# Patient Record
Sex: FEMALE | Race: WHITE | NOT HISPANIC OR LATINO | Employment: FULL TIME | URBAN - NONMETROPOLITAN AREA
[De-identification: names, ages, dates, MRNs, and addresses within clinical notes are randomized per-mention and may not be internally consistent; named-entity substitution may affect disease eponyms.]

---

## 2017-08-24 ENCOUNTER — TRANSCRIBE ORDERS (OUTPATIENT)
Dept: ADMINISTRATIVE | Facility: HOSPITAL | Age: 32
End: 2017-08-24

## 2017-08-24 ENCOUNTER — LAB (OUTPATIENT)
Dept: LAB | Facility: HOSPITAL | Age: 32
End: 2017-08-24
Attending: OBSTETRICS & GYNECOLOGY

## 2017-08-24 DIAGNOSIS — N93.9 EPISODE OF HEAVY VAGINAL BLEEDING: ICD-10-CM

## 2017-08-24 DIAGNOSIS — N93.9 EPISODE OF HEAVY VAGINAL BLEEDING: Primary | ICD-10-CM

## 2017-08-24 LAB
BASOPHILS # BLD AUTO: 0.05 10*3/MM3 (ref 0–0.2)
BASOPHILS NFR BLD AUTO: 0.8 % (ref 0–2)
DEPRECATED RDW RBC AUTO: 42.1 FL (ref 40–54)
EOSINOPHIL # BLD AUTO: 0.18 10*3/MM3 (ref 0–0.7)
EOSINOPHIL NFR BLD AUTO: 3 % (ref 0–4)
ERYTHROCYTE [DISTWIDTH] IN BLOOD BY AUTOMATED COUNT: 12.4 % (ref 12–15)
HCT VFR BLD AUTO: 37.2 % (ref 37–47)
HGB BLD-MCNC: 12.4 G/DL (ref 12–16)
LYMPHOCYTES # BLD AUTO: 1.81 10*3/MM3 (ref 0.72–4.86)
LYMPHOCYTES NFR BLD AUTO: 30.3 % (ref 15–45)
MCH RBC QN AUTO: 31.3 PG (ref 28–32)
MCHC RBC AUTO-ENTMCNC: 33.3 G/DL (ref 33–36)
MCV RBC AUTO: 93.9 FL (ref 82–98)
MONOCYTES # BLD AUTO: 0.57 10*3/MM3 (ref 0.19–1.3)
MONOCYTES NFR BLD AUTO: 9.5 % (ref 4–12)
NEUTROPHILS # BLD AUTO: 3.37 10*3/MM3 (ref 1.87–8.4)
NEUTROPHILS NFR BLD AUTO: 56.4 % (ref 39–78)
PLATELET # BLD AUTO: 186 10*3/MM3 (ref 130–400)
PMV BLD AUTO: 10.7 FL (ref 6–12)
RBC # BLD AUTO: 3.96 10*6/MM3 (ref 4.2–5.4)
T3FREE SERPL-MCNC: 4.15 PG/ML (ref 2.77–5.27)
T4 FREE SERPL-MCNC: 1.04 NG/DL (ref 0.78–2.19)
TSH SERPL DL<=0.05 MIU/L-ACNC: 2.04 MIU/ML (ref 0.47–4.68)
WBC NRBC COR # BLD: 5.98 10*3/MM3 (ref 4.8–10.8)

## 2017-08-24 PROCEDURE — 85025 COMPLETE CBC W/AUTO DIFF WBC: CPT | Performed by: OBSTETRICS & GYNECOLOGY

## 2017-08-24 PROCEDURE — 36415 COLL VENOUS BLD VENIPUNCTURE: CPT | Performed by: OBSTETRICS & GYNECOLOGY

## 2017-08-24 PROCEDURE — 84439 ASSAY OF FREE THYROXINE: CPT | Performed by: OBSTETRICS & GYNECOLOGY

## 2017-08-24 PROCEDURE — 84480 ASSAY TRIIODOTHYRONINE (T3): CPT | Performed by: OBSTETRICS & GYNECOLOGY

## 2017-08-24 PROCEDURE — 84443 ASSAY THYROID STIM HORMONE: CPT | Performed by: OBSTETRICS & GYNECOLOGY

## 2017-08-24 PROCEDURE — 84481 FREE ASSAY (FT-3): CPT | Performed by: OBSTETRICS & GYNECOLOGY

## 2017-08-25 LAB — T3 SERPL-MCNC: 152 NG/DL (ref 71–180)

## 2018-01-21 ENCOUNTER — OFFICE VISIT (OUTPATIENT)
Dept: URGENT CARE | Age: 33
End: 2018-01-21
Payer: MEDICAID

## 2018-01-21 VITALS
BODY MASS INDEX: 21.03 KG/M2 | TEMPERATURE: 99.6 F | DIASTOLIC BLOOD PRESSURE: 62 MMHG | OXYGEN SATURATION: 99 % | HEART RATE: 74 BPM | RESPIRATION RATE: 18 BRPM | WEIGHT: 134 LBS | HEIGHT: 67 IN | SYSTOLIC BLOOD PRESSURE: 102 MMHG

## 2018-01-21 DIAGNOSIS — R09.82 POST-NASAL DRIP: ICD-10-CM

## 2018-01-21 DIAGNOSIS — R05.9 COUGH: Primary | ICD-10-CM

## 2018-01-21 LAB
INFLUENZA A ANTIBODY: NEGATIVE
INFLUENZA B ANTIBODY: NEGATIVE
S PYO AG THROAT QL: NORMAL

## 2018-01-21 PROCEDURE — 87880 STREP A ASSAY W/OPTIC: CPT | Performed by: NURSE PRACTITIONER

## 2018-01-21 PROCEDURE — 99213 OFFICE O/P EST LOW 20 MIN: CPT | Performed by: NURSE PRACTITIONER

## 2018-01-21 PROCEDURE — 87804 INFLUENZA ASSAY W/OPTIC: CPT | Performed by: NURSE PRACTITIONER

## 2018-01-21 RX ORDER — FLUTICASONE PROPIONATE 50 MCG
1 SPRAY, SUSPENSION (ML) NASAL DAILY
Qty: 1 BOTTLE | Refills: 3 | Status: SHIPPED | OUTPATIENT
Start: 2018-01-21

## 2018-01-21 ASSESSMENT — ENCOUNTER SYMPTOMS
RHINORRHEA: 0
SORE THROAT: 1
RESPIRATORY NEGATIVE: 1
COUGH: 0
GASTROINTESTINAL NEGATIVE: 1
SHORTNESS OF BREATH: 0
FACIAL SWELLING: 0
SINUS PRESSURE: 0
WHEEZING: 0
SINUS PAIN: 0
ALLERGIC/IMMUNOLOGIC NEGATIVE: 1

## 2018-01-21 NOTE — PROGRESS NOTES
[address] [facility]  [address] [facility] [place] [address]  Dept: [phone]  Loc: [phone]    Hortensia Leblanc is a 27 y.o. female who presents today for her medical conditions/complaints as noted below. Hortensia Leblanc is c/o of Pharyngitis and Headache        HPI:     HPI   Pt presents to clinic with c/o dry scratchy throat. States she has been exposed to strep and would like to be tested. States she has post nasal drip. Denies fever or any other symptoms. Results for orders placed or performed in visit on 01/21/18   POCT rapid strep A   Result Value Ref Range    Strep A Ag None Detected None Detected   POCT Influenza A/B   Result Value Ref Range    Influenza A Ab negative     Influenza B Ab negative        History reviewed. No pertinent past medical history. History reviewed. No pertinent surgical history. History reviewed. No pertinent family history. Social History   Substance Use Topics    Smoking status: Former Smoker    Smokeless tobacco: Never Used    Alcohol use No      Current Outpatient Prescriptions   Medication Sig Dispense Refill    fluticasone (FLONASE) 50 MCG/ACT nasal spray 1 spray by Nasal route daily 1 Bottle 3     No current facility-administered medications for this visit. Allergies   Allergen Reactions    Zyrtec Allergy [Cetirizine Hcl] Other (See Comments)     Patient states the medication cause tachycardia    Augmentin [Amoxicillin-Pot Clavulanate] Diarrhea       Health Maintenance   Topic Date Due    HIV screen  12/23/1998    DTaP/Tdap/Td vaccine (1 - Tdap) 12/23/2002    Cervical cancer screen  12/23/2004    Flu vaccine (1) 09/01/2017       Subjective:      Review of Systems   Constitutional: Negative. Negative for fever. HENT: Positive for postnasal drip and sore throat. Negative for congestion, ear discharge, ear pain, facial swelling, rhinorrhea, sinus pain and sinus pressure. Respiratory: Negative. Negative for cough, shortness of breath and wheezing. Cardiovascular: Negative. Negative for chest pain. Gastrointestinal: Negative. Endocrine: Negative. Musculoskeletal: Negative. Skin: Negative for rash. Allergic/Immunologic: Negative. Negative for environmental allergies. Neurological: Positive for headaches. Psychiatric/Behavioral: Negative. Objective:     Physical Exam   Constitutional: She is oriented to person, place, and time. Vital signs are normal. She appears well-developed and well-nourished. Non-toxic appearance. She does not have a sickly appearance. She does not appear ill. No distress. HENT:   Head: Normocephalic and atraumatic. Right Ear: Hearing, tympanic membrane, external ear and ear canal normal.   Left Ear: Hearing, tympanic membrane, external ear and ear canal normal.   Nose: Nose normal.   Mouth/Throat: Uvula is midline, oropharynx is clear and moist and mucous membranes are normal. No oropharyngeal exudate, posterior oropharyngeal edema, posterior oropharyngeal erythema or tonsillar abscesses. Eyes: Conjunctivae are normal. Pupils are equal, round, and reactive to light. Right eye exhibits no discharge. Left eye exhibits no discharge. Neck: Trachea normal and normal range of motion. Cardiovascular: Normal rate and regular rhythm. Pulmonary/Chest: Effort normal and breath sounds normal. She has no decreased breath sounds. She has no wheezes. She has no rhonchi. She has no rales. Abdominal: Soft. Normal appearance. There is no tenderness. Lymphadenopathy:        Head (right side): No submental, no submandibular, no tonsillar, no preauricular, no posterior auricular and no occipital adenopathy present. Head (left side): No submental, no submandibular, no tonsillar, no preauricular, no posterior auricular and no occipital adenopathy present. She has no cervical adenopathy.         Right cervical: No superficial cervical, no deep cervical

## 2018-03-23 ENCOUNTER — OFFICE VISIT (OUTPATIENT)
Dept: URGENT CARE | Age: 33
End: 2018-03-23
Payer: MEDICAID

## 2018-03-23 VITALS
TEMPERATURE: 98.4 F | RESPIRATION RATE: 20 BRPM | HEART RATE: 76 BPM | WEIGHT: 135 LBS | OXYGEN SATURATION: 99 % | SYSTOLIC BLOOD PRESSURE: 107 MMHG | BODY MASS INDEX: 21.14 KG/M2 | DIASTOLIC BLOOD PRESSURE: 67 MMHG

## 2018-03-23 DIAGNOSIS — L02.419 AXILLARY ABSCESS: Primary | ICD-10-CM

## 2018-03-23 PROCEDURE — 99213 OFFICE O/P EST LOW 20 MIN: CPT | Performed by: NURSE PRACTITIONER

## 2018-03-23 RX ORDER — SULFAMETHOXAZOLE AND TRIMETHOPRIM 800; 160 MG/1; MG/1
1 TABLET ORAL 2 TIMES DAILY
Qty: 20 TABLET | Refills: 0 | Status: SHIPPED | OUTPATIENT
Start: 2018-03-23 | End: 2018-04-02

## 2018-03-23 ASSESSMENT — ENCOUNTER SYMPTOMS
COUGH: 0
COLOR CHANGE: 1

## 2018-06-29 ENCOUNTER — OFFICE VISIT (OUTPATIENT)
Dept: URGENT CARE | Age: 33
End: 2018-06-29
Payer: MEDICAID

## 2018-06-29 VITALS
WEIGHT: 135 LBS | DIASTOLIC BLOOD PRESSURE: 56 MMHG | TEMPERATURE: 99.1 F | OXYGEN SATURATION: 98 % | BODY MASS INDEX: 21.19 KG/M2 | HEIGHT: 67 IN | HEART RATE: 77 BPM | RESPIRATION RATE: 20 BRPM | SYSTOLIC BLOOD PRESSURE: 100 MMHG

## 2018-06-29 DIAGNOSIS — H66.92 ACUTE LEFT OTITIS MEDIA: Primary | ICD-10-CM

## 2018-06-29 PROCEDURE — 99213 OFFICE O/P EST LOW 20 MIN: CPT | Performed by: NURSE PRACTITIONER

## 2018-06-29 RX ORDER — IBUPROFEN 800 MG/1
TABLET ORAL
COMMUNITY
Start: 2018-05-14

## 2018-06-29 RX ORDER — CEFDINIR 300 MG/1
300 CAPSULE ORAL 2 TIMES DAILY
Qty: 20 CAPSULE | Refills: 0 | Status: SHIPPED | OUTPATIENT
Start: 2018-06-29 | End: 2018-07-02 | Stop reason: SINTOL

## 2018-06-29 RX ORDER — FLUCONAZOLE 150 MG/1
TABLET ORAL
Qty: 2 TABLET | Refills: 0 | Status: SHIPPED | OUTPATIENT
Start: 2018-06-29

## 2018-06-29 RX ORDER — RANITIDINE 150 MG/1
TABLET ORAL
COMMUNITY
Start: 2018-05-14

## 2018-07-02 ENCOUNTER — TELEPHONE (OUTPATIENT)
Dept: URGENT CARE | Age: 33
End: 2018-07-02

## 2018-07-02 RX ORDER — CLARITHROMYCIN 500 MG/1
500 TABLET, COATED ORAL 2 TIMES DAILY
Qty: 20 TABLET | Refills: 0 | Status: SHIPPED | OUTPATIENT
Start: 2018-07-02 | End: 2018-07-12

## 2018-10-28 ENCOUNTER — OFFICE VISIT (OUTPATIENT)
Dept: URGENT CARE | Age: 33
End: 2018-10-28
Payer: MEDICAID

## 2018-10-28 VITALS
DIASTOLIC BLOOD PRESSURE: 61 MMHG | RESPIRATION RATE: 18 BRPM | WEIGHT: 140.4 LBS | OXYGEN SATURATION: 98 % | TEMPERATURE: 98.8 F | HEIGHT: 67 IN | HEART RATE: 83 BPM | SYSTOLIC BLOOD PRESSURE: 105 MMHG | BODY MASS INDEX: 22.03 KG/M2

## 2018-10-28 DIAGNOSIS — J02.9 ACUTE PHARYNGITIS, UNSPECIFIED ETIOLOGY: Primary | ICD-10-CM

## 2018-10-28 LAB — S PYO AG THROAT QL: NORMAL

## 2018-10-28 PROCEDURE — 99213 OFFICE O/P EST LOW 20 MIN: CPT | Performed by: NURSE PRACTITIONER

## 2018-10-28 PROCEDURE — 87880 STREP A ASSAY W/OPTIC: CPT | Performed by: NURSE PRACTITIONER

## 2018-10-28 RX ORDER — AZITHROMYCIN 250 MG/1
TABLET, FILM COATED ORAL
Qty: 1 PACKET | Refills: 0 | Status: SHIPPED | OUTPATIENT
Start: 2018-10-28 | End: 2018-11-01

## 2018-10-28 ASSESSMENT — ENCOUNTER SYMPTOMS
EYES NEGATIVE: 1
ALLERGIC/IMMUNOLOGIC NEGATIVE: 1
RESPIRATORY NEGATIVE: 1
SINUS PRESSURE: 0
GASTROINTESTINAL NEGATIVE: 1
SORE THROAT: 1

## 2018-10-28 NOTE — PROGRESS NOTES
[address] [facility]  [address] [facility] [address]  Dept: [phone]  Loc: [phone]    Dov Ring is a 27 y.o. female who presents today for her medical conditions/complaintsas noted below. Dov Ring is c/o of Pharyngitis    She reports sore throat and post nasal drainage for a couple of days. Denies any cough or fever. States Augmentin causes loose stools. HPI:     HPI    No past medical history on file. No past surgical history on file. No family history on file. Social History   Substance Use Topics    Smoking status: Current Every Day Smoker     Types: Cigarettes    Smokeless tobacco: Never Used    Alcohol use No      Current Outpatient Prescriptions   Medication Sig Dispense Refill    azithromycin (ZITHROMAX Z-HARDEEP) 250 MG tablet Take 2 tablets (500 mg) on Day 1, and then take 1 tablet (250 mg) on days 2 through 5. 1 packet 0    ranitidine (ZANTAC) 150 MG tablet       ibuprofen (ADVIL;MOTRIN) 800 MG tablet       fluticasone (FLONASE) 50 MCG/ACT nasal spray 1 spray by Nasal route daily 1 Bottle 3    fluconazole (DIFLUCAN) 150 MG tablet Take one tablet today, one tablet in three days. 2 tablet 0     No current facility-administered medications for this visit. Allergies   Allergen Reactions    Zyrtec Allergy [Cetirizine Hcl] Other (See Comments)     Patient states the medication cause tachycardia    Augmentin [Amoxicillin-Pot Clavulanate] Diarrhea       Health Maintenance   Topic Date Due    HIV screen  12/23/1998    DTaP/Tdap/Td vaccine (1 - Tdap) 12/23/2002    Cervical cancer screen  12/23/2004    Flu vaccine (1) 09/01/2018       Subjective:     Review of Systems   Constitutional: Negative. HENT: Positive for postnasal drip and sore throat. Negative for congestion, ear pain and sinus pressure. Eyes: Negative. Respiratory: Negative. Cardiovascular: Negative. Gastrointestinal: Negative. also help. These should not be given to young children because of the risk of choking. · Do not smoke or allow others to smoke around you. If you need help quitting, talk to your doctor about stop-smoking programs and medicines. These can increase your chances of quitting for good. · Use a vaporizer or humidifier to add moisture to your bedroom. Follow the directions for cleaning the machine. When should you call for help? Call your doctor now or seek immediate medical care if:    · You have new or worse trouble swallowing.     · Your sore throat gets much worse on one side.    Watch closely for changes in your health, and be sure to contact your doctor if you do not get better as expected. Where can you learn more? Go to https://[email or web address] Development[employer]Pymetricseb.[email or web address]. org and sign in to your [employer] account. Enter A833 in the [employer] box to learn more about \"Sore Throat: Care Instructions. \"     If you do not have an account, please click on the \"Sign Up Now\" link. Current as of: May 12, 2017  Content Version: 11.7  © 1147-0597 [employer]. Care instructions adapted under license by [facility] ([facility]). If you have questions about a medical condition or this instruction, always ask your healthcare professional. [place] [address] any warranty or liability for your use of this information.              Electronically signed by PAT Patrick on 10/28/2018 at 11:58 AM

## 2018-10-28 NOTE — PATIENT INSTRUCTIONS
chances of quitting for good. · Use a vaporizer or humidifier to add moisture to your bedroom. Follow the directions for cleaning the machine. When should you call for help? Call your doctor now or seek immediate medical care if:    · You have new or worse trouble swallowing.     · Your sore throat gets much worse on one side.    Watch closely for changes in your health, and be sure to contact your doctor if you do not get better as expected. Where can you learn more? Go to https://[employer]Living.Octro. org and sign in to your [employer] account. Enter N600 in the [employer] box to learn more about \"Sore Throat: Care Instructions. \"     If you do not have an account, please click on the \"Sign Up Now\" link. Current as of: May 12, 2017  Content Version: 11.7  © 9479-7844 [employer]. Care instructions adapted under license by [facility] ([facility]). If you have questions about a medical condition or this instruction, always ask your healthcare professional. [place] [address] any warranty or liability for your use of this information.

## 2019-01-25 RX ORDER — OSELTAMIVIR PHOSPHATE 75 MG/1
75 CAPSULE ORAL DAILY
Qty: 10 CAPSULE | Refills: 0 | Status: SHIPPED | OUTPATIENT
Start: 2019-01-25 | End: 2019-02-04

## 2019-02-20 ENCOUNTER — OFFICE VISIT (OUTPATIENT)
Dept: URGENT CARE | Age: 34
End: 2019-02-20
Payer: MEDICAID

## 2019-02-20 VITALS
HEART RATE: 113 BPM | HEIGHT: 66 IN | BODY MASS INDEX: 21.92 KG/M2 | SYSTOLIC BLOOD PRESSURE: 126 MMHG | OXYGEN SATURATION: 98 % | TEMPERATURE: 101.2 F | RESPIRATION RATE: 20 BRPM | DIASTOLIC BLOOD PRESSURE: 79 MMHG | WEIGHT: 136.4 LBS

## 2019-02-20 DIAGNOSIS — R52 BODY ACHES: Primary | ICD-10-CM

## 2019-02-20 LAB
INFLUENZA A ANTIBODY: NORMAL
INFLUENZA B ANTIBODY: NORMAL
S PYO AG THROAT QL: NORMAL

## 2019-02-20 PROCEDURE — 99213 OFFICE O/P EST LOW 20 MIN: CPT | Performed by: NURSE PRACTITIONER

## 2019-02-20 PROCEDURE — 87804 INFLUENZA ASSAY W/OPTIC: CPT | Performed by: NURSE PRACTITIONER

## 2019-02-20 PROCEDURE — 87880 STREP A ASSAY W/OPTIC: CPT | Performed by: NURSE PRACTITIONER

## 2019-02-20 ASSESSMENT — ENCOUNTER SYMPTOMS
ALLERGIC/IMMUNOLOGIC NEGATIVE: 1
NAUSEA: 0
APNEA: 0
COLOR CHANGE: 0
COUGH: 1
RHINORRHEA: 0
STRIDOR: 0
VOICE CHANGE: 0
CHOKING: 0
SORE THROAT: 0
SHORTNESS OF BREATH: 0
ABDOMINAL PAIN: 0
TROUBLE SWALLOWING: 0
WHEEZING: 0
EYES NEGATIVE: 1
CHEST TIGHTNESS: 0

## 2019-06-10 ENCOUNTER — HOSPITAL ENCOUNTER (OUTPATIENT)
Dept: WOMENS IMAGING | Age: 34
Discharge: HOME | End: 2019-06-10
Payer: MEDICAID

## 2019-06-10 ENCOUNTER — HOSPITAL ENCOUNTER (OUTPATIENT)
Dept: NON INVASIVE DIAGNOSTICS | Age: 34
Discharge: HOME | End: 2019-06-10
Payer: MEDICAID

## 2019-06-10 DIAGNOSIS — Z12.31 ENCOUNTER FOR SCREENING MAMMOGRAM FOR MALIGNANT NEOPLASM OF BREAST: ICD-10-CM

## 2019-06-10 LAB
HEREDITARY CANCER TEST-MYRIAD: NORMAL
LV EF: 58 %
LVEF MODALITY: NORMAL

## 2019-06-10 PROCEDURE — 77063 BREAST TOMOSYNTHESIS BI: CPT

## 2019-06-10 PROCEDURE — 93306 TTE W/DOPPLER COMPLETE: CPT

## 2019-07-01 ENCOUNTER — HOSPITAL ENCOUNTER (OUTPATIENT)
Dept: WOMENS IMAGING | Age: 34
Discharge: HOME | End: 2019-07-01
Payer: MEDICAID

## 2019-07-01 NOTE — CONSULTS
Completed a face-to-face post-test results disclosure discussion with patient. The patient's testing was Negative for a clinically actionable gene mutation. If riskScore was performed for the patient, it was reviewed in detail. We also reviewed family members are that may benefit from testing. I directed the patient to follow up with their healthcare provider, for next steps regarding their healthcare. This patient was positive for a Variant of uncertain significance (VUS). We discussed in detail that as of right now, this gene mutation has not proven to be linked to increasing a person's risk of developing any type of cancer. The company, [employer], will continue to study this variant and will reach out to myself and the patient to let us know if this mutation is ever linked to any type of cancer or if it proves to be nothing also. Patient's Breast Cancer RiskScore was elevated at 25.2%. This qualifies patient for a CBE every 6 months as well as an MRI with contrast of her breast annually. I referred her to David Harding PA-C for risk reduction strategies and to manage breast health. Appointment made for September 6th at 1030.

## 2020-06-18 ENCOUNTER — HOSPITAL ENCOUNTER (OUTPATIENT)
Dept: WOMENS IMAGING | Age: 35
Discharge: HOME | End: 2020-06-18
Payer: MEDICAID

## 2020-06-18 PROCEDURE — 77063 BREAST TOMOSYNTHESIS BI: CPT

## 2021-06-04 LAB
ALBUMIN SERPL-MCNC: 4.5 G/DL (ref 3.5–5.2)
ALP BLD-CCNC: 59 U/L (ref 35–104)
ALT SERPL-CCNC: 14 U/L (ref 5–33)
ANION GAP SERPL CALCULATED.3IONS-SCNC: 9 MMOL/L (ref 7–19)
AST SERPL-CCNC: 18 U/L (ref 5–32)
BASOPHILS ABSOLUTE: 0.1 K/UL (ref 0–0.2)
BASOPHILS RELATIVE PERCENT: 1.2 % (ref 0–1)
BILIRUB SERPL-MCNC: 0.3 MG/DL (ref 0.2–1.2)
BUN BLDV-MCNC: 11 MG/DL (ref 6–20)
CALCIUM SERPL-MCNC: 9 MG/DL (ref 8.6–10)
CHLORIDE BLD-SCNC: 102 MMOL/L (ref 98–111)
CHOLESTEROL, TOTAL: 157 MG/DL (ref 160–199)
CO2: 27 MMOL/L (ref 22–29)
CREAT SERPL-MCNC: 0.6 MG/DL (ref 0.5–0.9)
EOSINOPHILS ABSOLUTE: 0.1 K/UL (ref 0–0.6)
EOSINOPHILS RELATIVE PERCENT: 1.7 % (ref 0–5)
GFR AFRICAN AMERICAN: >59
GFR NON-AFRICAN AMERICAN: >60
GLUCOSE BLD-MCNC: 83 MG/DL (ref 74–109)
HCT VFR BLD CALC: 40.1 % (ref 37–47)
HDLC SERPL-MCNC: 61 MG/DL (ref 65–121)
HEMOGLOBIN: 12.9 G/DL (ref 12–16)
IMMATURE GRANULOCYTES #: 0 K/UL
LDL CHOLESTEROL CALCULATED: 82 MG/DL
LYMPHOCYTES ABSOLUTE: 2 K/UL (ref 1.1–4.5)
LYMPHOCYTES RELATIVE PERCENT: 33.5 % (ref 20–40)
MCH RBC QN AUTO: 30.6 PG (ref 27–31)
MCHC RBC AUTO-ENTMCNC: 32.2 G/DL (ref 33–37)
MCV RBC AUTO: 95.2 FL (ref 81–99)
MONOCYTES ABSOLUTE: 0.6 K/UL (ref 0–0.9)
MONOCYTES RELATIVE PERCENT: 9.9 % (ref 0–10)
NEUTROPHILS ABSOLUTE: 3.2 K/UL (ref 1.5–7.5)
NEUTROPHILS RELATIVE PERCENT: 53.5 % (ref 50–65)
PDW BLD-RTO: 12.8 % (ref 11.5–14.5)
PLATELET # BLD: 192 K/UL (ref 130–400)
PMV BLD AUTO: 10.7 FL (ref 9.4–12.3)
POTASSIUM SERPL-SCNC: 4.2 MMOL/L (ref 3.5–5)
RBC # BLD: 4.21 M/UL (ref 4.2–5.4)
SODIUM BLD-SCNC: 138 MMOL/L (ref 136–145)
TOTAL PROTEIN: 7.1 G/DL (ref 6.6–8.7)
TRIGL SERPL-MCNC: 70 MG/DL (ref 0–149)
TSH REFLEX FT4: 1.42 UIU/ML (ref 0.35–5.5)
VITAMIN D 25-HYDROXY: 33.4 NG/ML
WBC # BLD: 5.9 K/UL (ref 4.8–10.8)

## 2021-06-21 ENCOUNTER — HOSPITAL ENCOUNTER (OUTPATIENT)
Dept: WOMENS IMAGING | Age: 36
Discharge: HOME | End: 2021-06-21
Payer: MEDICAID

## 2021-06-21 DIAGNOSIS — Z12.31 ENCOUNTER FOR SCREENING MAMMOGRAM FOR MALIGNANT NEOPLASM OF BREAST: ICD-10-CM

## 2021-06-21 PROCEDURE — 77067 SCR MAMMO BI INCL CAD: CPT

## 2022-05-04 PROCEDURE — U0004 COV-19 TEST NON-CDC HGH THRU: HCPCS | Performed by: NURSE PRACTITIONER

## 2022-06-07 PROCEDURE — 87636 SARSCOV2 & INF A&B AMP PRB: CPT | Performed by: NURSE PRACTITIONER

## 2022-06-22 ENCOUNTER — TRANSCRIBE ORDERS (OUTPATIENT)
Dept: ADMINISTRATIVE | Facility: HOSPITAL | Age: 37
End: 2022-06-22

## 2022-06-22 ENCOUNTER — LAB (OUTPATIENT)
Dept: LAB | Facility: HOSPITAL | Age: 37
End: 2022-06-22

## 2022-06-22 DIAGNOSIS — R53.83 TIREDNESS: ICD-10-CM

## 2022-06-22 DIAGNOSIS — R53.83 TIREDNESS: Primary | ICD-10-CM

## 2022-06-22 LAB
25(OH)D3 SERPL-MCNC: 39 NG/ML (ref 30–100)
ALBUMIN SERPL-MCNC: 4.2 G/DL (ref 3.5–5.2)
ALBUMIN/GLOB SERPL: 1.6 G/DL
ALP SERPL-CCNC: 57 U/L (ref 39–117)
ALT SERPL W P-5'-P-CCNC: 13 U/L (ref 1–33)
ANION GAP SERPL CALCULATED.3IONS-SCNC: 9.2 MMOL/L (ref 5–15)
AST SERPL-CCNC: 18 U/L (ref 1–32)
BASOPHILS # BLD AUTO: 0.1 10*3/MM3 (ref 0–0.2)
BASOPHILS NFR BLD AUTO: 2 % (ref 0–1.5)
BILIRUB SERPL-MCNC: 0.5 MG/DL (ref 0–1.2)
BUN SERPL-MCNC: 9 MG/DL (ref 6–20)
BUN/CREAT SERPL: 11.8 (ref 7–25)
CALCIUM SPEC-SCNC: 8.7 MG/DL (ref 8.6–10.5)
CHLORIDE SERPL-SCNC: 104 MMOL/L (ref 98–107)
CHOLEST SERPL-MCNC: 142 MG/DL (ref 0–200)
CO2 SERPL-SCNC: 25.8 MMOL/L (ref 22–29)
CREAT SERPL-MCNC: 0.76 MG/DL (ref 0.57–1)
DEPRECATED RDW RBC AUTO: 39.2 FL (ref 37–54)
EGFRCR SERPLBLD CKD-EPI 2021: 103 ML/MIN/1.73
EOSINOPHIL # BLD AUTO: 0.21 10*3/MM3 (ref 0–0.4)
EOSINOPHIL NFR BLD AUTO: 4.3 % (ref 0.3–6.2)
ERYTHROCYTE [DISTWIDTH] IN BLOOD BY AUTOMATED COUNT: 12.2 % (ref 12.3–15.4)
GLOBULIN UR ELPH-MCNC: 2.6 GM/DL
GLUCOSE SERPL-MCNC: 88 MG/DL (ref 65–99)
HCT VFR BLD AUTO: 35.1 % (ref 34–46.6)
HDLC SERPL-MCNC: 52 MG/DL (ref 40–60)
HGB BLD-MCNC: 11.9 G/DL (ref 12–15.9)
IMM GRANULOCYTES # BLD AUTO: 0.02 10*3/MM3 (ref 0–0.05)
IMM GRANULOCYTES NFR BLD AUTO: 0.4 % (ref 0–0.5)
LDLC SERPL CALC-MCNC: 76 MG/DL (ref 0–100)
LDLC/HDLC SERPL: 1.45 {RATIO}
LYMPHOCYTES # BLD AUTO: 1.58 10*3/MM3 (ref 0.7–3.1)
LYMPHOCYTES NFR BLD AUTO: 32.1 % (ref 19.6–45.3)
MCH RBC QN AUTO: 30.8 PG (ref 26.6–33)
MCHC RBC AUTO-ENTMCNC: 33.9 G/DL (ref 31.5–35.7)
MCV RBC AUTO: 90.9 FL (ref 79–97)
MONOCYTES # BLD AUTO: 0.43 10*3/MM3 (ref 0.1–0.9)
MONOCYTES NFR BLD AUTO: 8.7 % (ref 5–12)
NEUTROPHILS NFR BLD AUTO: 2.58 10*3/MM3 (ref 1.7–7)
NEUTROPHILS NFR BLD AUTO: 52.5 % (ref 42.7–76)
NRBC BLD AUTO-RTO: 0 /100 WBC (ref 0–0.2)
PLATELET # BLD AUTO: 233 10*3/MM3 (ref 140–450)
PMV BLD AUTO: 11 FL (ref 6–12)
POTASSIUM SERPL-SCNC: 4 MMOL/L (ref 3.5–5.2)
PROT SERPL-MCNC: 6.8 G/DL (ref 6–8.5)
RBC # BLD AUTO: 3.86 10*6/MM3 (ref 3.77–5.28)
SODIUM SERPL-SCNC: 139 MMOL/L (ref 136–145)
TRIGL SERPL-MCNC: 73 MG/DL (ref 0–150)
VLDLC SERPL-MCNC: 14 MG/DL (ref 5–40)
WBC NRBC COR # BLD: 4.92 10*3/MM3 (ref 3.4–10.8)

## 2022-06-22 PROCEDURE — 80061 LIPID PANEL: CPT

## 2022-06-22 PROCEDURE — 80053 COMPREHEN METABOLIC PANEL: CPT

## 2022-06-22 PROCEDURE — 85025 COMPLETE CBC W/AUTO DIFF WBC: CPT

## 2022-06-22 PROCEDURE — 36415 COLL VENOUS BLD VENIPUNCTURE: CPT

## 2022-06-22 PROCEDURE — 82306 VITAMIN D 25 HYDROXY: CPT

## 2022-07-12 ENCOUNTER — HOSPITAL ENCOUNTER (OUTPATIENT)
Dept: WOMENS IMAGING | Age: 37
Discharge: HOME | End: 2022-07-12
Payer: MEDICAID

## 2022-07-12 DIAGNOSIS — Z12.31 SCREENING MAMMOGRAM, ENCOUNTER FOR: ICD-10-CM

## 2022-07-12 PROCEDURE — 77063 BREAST TOMOSYNTHESIS BI: CPT

## 2022-07-12 PROCEDURE — 77067 SCR MAMMO BI INCL CAD: CPT | Performed by: RADIOLOGY

## 2023-07-19 ENCOUNTER — HOSPITAL ENCOUNTER (OUTPATIENT)
Dept: WOMENS IMAGING | Age: 38
Discharge: HOME | End: 2023-07-19
Attending: FAMILY MEDICINE
Payer: MEDICAID

## 2023-07-19 DIAGNOSIS — Z12.31 ENCOUNTER FOR SCREENING MAMMOGRAM FOR MALIGNANT NEOPLASM OF BREAST: ICD-10-CM

## 2023-07-19 PROCEDURE — 77063 BREAST TOMOSYNTHESIS BI: CPT

## 2024-02-12 ENCOUNTER — TRANSCRIBE ORDERS (OUTPATIENT)
Dept: ADMINISTRATIVE | Age: 39
End: 2024-02-12

## 2024-02-12 DIAGNOSIS — Z12.31 ENCOUNTER FOR SCREENING MAMMOGRAM FOR MALIGNANT NEOPLASM OF BREAST: Primary | ICD-10-CM

## 2024-08-02 ENCOUNTER — HOSPITAL ENCOUNTER (OUTPATIENT)
Dept: WOMENS IMAGING | Age: 39
Discharge: HOME | End: 2024-08-02
Attending: FAMILY MEDICINE
Payer: MEDICAID

## 2024-08-02 DIAGNOSIS — Z12.31 ENCOUNTER FOR SCREENING MAMMOGRAM FOR MALIGNANT NEOPLASM OF BREAST: ICD-10-CM

## 2024-08-02 PROCEDURE — 77067 SCR MAMMO BI INCL CAD: CPT

## 2025-02-07 ENCOUNTER — TRANSCRIBE ORDERS (OUTPATIENT)
Dept: ADMINISTRATIVE | Age: 40
End: 2025-02-07

## 2025-02-07 DIAGNOSIS — Z12.31 ENCOUNTER FOR SCREENING MAMMOGRAM FOR MALIGNANT NEOPLASM OF BREAST: Primary | ICD-10-CM

## 2025-05-17 PROCEDURE — 87086 URINE CULTURE/COLONY COUNT: CPT | Performed by: NURSE PRACTITIONER

## 2025-05-26 ENCOUNTER — APPOINTMENT (OUTPATIENT)
Dept: GENERAL RADIOLOGY | Facility: HOSPITAL | Age: 40
End: 2025-05-26
Payer: COMMERCIAL

## 2025-05-26 PROCEDURE — 87086 URINE CULTURE/COLONY COUNT: CPT | Performed by: NURSE PRACTITIONER

## 2025-05-26 PROCEDURE — 74018 RADEX ABDOMEN 1 VIEW: CPT
